# Patient Record
Sex: FEMALE
[De-identification: names, ages, dates, MRNs, and addresses within clinical notes are randomized per-mention and may not be internally consistent; named-entity substitution may affect disease eponyms.]

---
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
## 2022-03-04 ENCOUNTER — APPOINTMENT (OUTPATIENT)
Dept: PEDIATRICS | Facility: CLINIC | Age: 22
End: 2022-03-04
Payer: COMMERCIAL

## 2022-03-04 VITALS — OXYGEN SATURATION: 97 % | TEMPERATURE: 97.6 F

## 2022-03-04 PROCEDURE — 99214 OFFICE O/P EST MOD 30 MIN: CPT | Mod: 25

## 2022-03-04 RX ORDER — ALBUTEROL SULFATE 2.5 MG/3ML
(2.5 MG/3ML) SOLUTION RESPIRATORY (INHALATION)
Qty: 0 | Refills: 0 | Status: COMPLETED | OUTPATIENT
Start: 2022-03-04

## 2022-03-04 RX ADMIN — ALBUTEROL SULFATE 1 0.083%: 2.5 SOLUTION RESPIRATORY (INHALATION) at 00:00

## 2022-03-04 NOTE — DISCUSSION/SUMMARY
[FreeTextEntry1] : ALBUTEROL NEB GIVEN IN OFFICE X 1\par POST TX LCTA BL\par O2 SAT 99%\par \par RX ZPACK X 5 DAYS\par PREDNISONE X 5 DAYS\par CONT W INHALER 2 PUFFS Q4-6 PRN\par RE CK CHEST IN 1 WK OR SOONER IF SX WORSEN

## 2022-03-04 NOTE — HISTORY OF PRESENT ILLNESS
[de-identified] : cough/chest tightness [FreeTextEntry6] : sick for past 2 wks\par has been [facility]\par Dx w bronchitis - on albuterol HFA w minimal improvement\par cough is worse\par feeling tight

## 2022-06-20 ENCOUNTER — APPOINTMENT (OUTPATIENT)
Dept: PEDIATRICS | Facility: CLINIC | Age: 22
End: 2022-06-20
Payer: COMMERCIAL

## 2022-06-20 DIAGNOSIS — G47.33 OBSTRUCTIVE SLEEP APNEA (ADULT) (PEDIATRIC): ICD-10-CM

## 2022-06-20 DIAGNOSIS — Z87.19 PERSONAL HISTORY OF OTHER DISEASES OF THE DIGESTIVE SYSTEM: ICD-10-CM

## 2022-06-20 DIAGNOSIS — Z87.09 PERSONAL HISTORY OF OTHER DISEASES OF THE RESPIRATORY SYSTEM: ICD-10-CM

## 2022-06-20 PROCEDURE — 99213 OFFICE O/P EST LOW 20 MIN: CPT

## 2022-06-20 RX ORDER — BENZONATATE 200 MG/1
200 CAPSULE ORAL
Qty: 20 | Refills: 0 | Status: COMPLETED | COMMUNITY
Start: 2022-02-23

## 2022-06-20 RX ORDER — FLUOXETINE HYDROCHLORIDE 40 MG/1
CAPSULE ORAL
Refills: 0 | Status: COMPLETED | COMMUNITY
End: 2022-06-20

## 2022-06-20 RX ORDER — SOLRIAMFETOL 75 MG/1
75 TABLET, FILM COATED ORAL
Qty: 30 | Refills: 0 | Status: COMPLETED | COMMUNITY
Start: 2021-12-30

## 2022-06-20 RX ORDER — PREDNISONE 20 MG/1
20 TABLET ORAL DAILY
Qty: 10 | Refills: 0 | Status: COMPLETED | COMMUNITY
Start: 2022-03-04 | End: 2022-06-20

## 2022-06-20 RX ORDER — ALBUTEROL SULFATE 90 UG/1
108 (90 BASE) INHALANT RESPIRATORY (INHALATION)
Qty: 8 | Refills: 0 | Status: COMPLETED | COMMUNITY
Start: 2022-02-28

## 2022-06-20 RX ORDER — FLUCONAZOLE 150 MG/1
150 TABLET ORAL
Qty: 1 | Refills: 0 | Status: COMPLETED | COMMUNITY
Start: 2022-04-05

## 2022-06-20 NOTE — HISTORY OF PRESENT ILLNESS
[de-identified] : persistent cough [FreeTextEntry6] : patient has concerns about a persistent cough over past several weeks no distress no fever \par at times productive \par denies sinus headache or pain\par also has inner left thigh irritation/abrasion due to  duties and running around for the [facility]

## 2022-06-20 NOTE — DISCUSSION/SUMMARY
[FreeTextEntry1] : persistent cough warrants trial of a zpak\par abrasion will use mupirocin \par call back prn

## 2022-06-20 NOTE — PHYSICAL EXAM
[Clear to Auscultation Bilaterally] : clear to auscultation bilaterally [NL] : normotonic [de-identified] : abrasion left inner thigh

## 2022-06-20 NOTE — REVIEW OF SYSTEMS
[Cough] : cough [Abrasion] : abrasion [Negative] : Genitourinary [Fever] : no fever [Chills] : no chills [Headache] : no headache [Eye Discharge] : no eye discharge [Ear Pain] : no ear pain [Nasal Discharge] : no nasal discharge [Nasal Congestion] : no nasal congestion [Sinus Pressure] : no sinus pressure [Sore Throat] : no sore throat [Tachypnea] : not tachypneic [Wheezing] : no wheezing [Congestion] : no congestion [Shortness of Breath] : no shortness of breath

## 2022-06-30 ENCOUNTER — APPOINTMENT (OUTPATIENT)
Dept: PEDIATRICS | Facility: CLINIC | Age: 22
End: 2022-06-30

## 2022-06-30 VITALS — TEMPERATURE: 98.1 F

## 2022-06-30 DIAGNOSIS — Z87.828 PERSONAL HISTORY OF OTHER (HEALED) PHYSICAL INJURY AND TRAUMA: ICD-10-CM

## 2022-06-30 PROCEDURE — 99213 OFFICE O/P EST LOW 20 MIN: CPT

## 2022-06-30 RX ORDER — DOXYCYCLINE HYCLATE 100 MG/1
100 CAPSULE ORAL
Qty: 14 | Refills: 0 | Status: COMPLETED | COMMUNITY
Start: 2022-04-05 | End: 2022-06-30

## 2022-06-30 RX ORDER — AMOXICILLIN AND CLAVULANATE POTASSIUM 875; 125 MG/1; MG/1
875-125 TABLET, COATED ORAL
Qty: 20 | Refills: 0 | Status: COMPLETED | COMMUNITY
Start: 2022-06-30 | End: 2022-07-10

## 2022-06-30 RX ORDER — AZITHROMYCIN 250 MG/1
250 TABLET, FILM COATED ORAL
Qty: 6 | Refills: 0 | Status: COMPLETED | COMMUNITY
Start: 2022-03-04 | End: 2022-06-30

## 2022-06-30 NOTE — HISTORY OF PRESENT ILLNESS
[de-identified] : persistent cough [FreeTextEntry6] : patient has the cough which did not respond to the zpak prescribed on june 20.\par cough productive \par some rhinorrhea which when present worsens the cough \par denies facial pain or headache\par with the pnd may well be a sinusitis that did not respond to the zpak\par try course of augmentin as well as flonase to be started\par follow up prn here\par patient was told by student health b/c of a multitude of sinus infections this year to see ENT\par

## 2022-06-30 NOTE — PHYSICAL EXAM
[Clear TM bilaterally] : clear tympanic membranes bilaterally [Nonerythematous Oropharynx] : nonerythematous oropharynx [Clear to Auscultation Bilaterally] : clear to auscultation bilaterally [NL] : warm [de-identified] : thick white pnd noted

## 2022-06-30 NOTE — DISCUSSION/SUMMARY
[FreeTextEntry1] : Recommend antibiotics, nasal saline, and guaifenesin. Side effect of treatment includes but not limited to diarrhea. Return if symptoms worsen or persist.\par has been referred to ENT due to number of recurrences

## 2022-06-30 NOTE — REVIEW OF SYSTEMS
[Fever] : no fever [Headache] : no headache [Eye Discharge] : no eye discharge [Ear Pain] : no ear pain [Nasal Discharge] : no nasal discharge [Nasal Congestion] : nasal congestion [Snoring] : no snoring [Sinus Pressure] : no sinus pressure [Sore Throat] : no sore throat [Tachypnea] : not tachypneic [Wheezing] : no wheezing [Cough] : cough [Negative] : Skin

## 2022-08-10 ENCOUNTER — APPOINTMENT (OUTPATIENT)
Dept: PEDIATRICS | Facility: CLINIC | Age: 22
End: 2022-08-10

## 2022-12-21 ENCOUNTER — APPOINTMENT (OUTPATIENT)
Dept: PEDIATRICS | Facility: CLINIC | Age: 22
End: 2022-12-21

## 2022-12-21 DIAGNOSIS — J32.9 CHRONIC SINUSITIS, UNSPECIFIED: ICD-10-CM

## 2022-12-21 PROCEDURE — 99213 OFFICE O/P EST LOW 20 MIN: CPT

## 2022-12-21 RX ORDER — MUPIROCIN 20 MG/G
2 OINTMENT TOPICAL TWICE DAILY
Qty: 1 | Refills: 1 | Status: DISCONTINUED | COMMUNITY
Start: 2022-06-20 | End: 2022-12-21
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
## 2022-12-21 NOTE — PHYSICAL EXAM
[NL] : moves all extremities x4, warm, well perfused x4 [de-identified] : remnants of facial hematoma to R side of face under cheekbone no fracture likely all hematoma related -quarter size felt under skin but due to makeup cant see discoloration although pt says not discolored

## 2022-12-21 NOTE — DISCUSSION/SUMMARY
[FreeTextEntry1] : hematoma secondary to injury\par no need for imaging or meds\par reassured will heal over time\par if doesn’t can refer out but essentially not much intervention to have done\par chart reviewed

## 2023-05-16 ENCOUNTER — APPOINTMENT (OUTPATIENT)
Dept: PEDIATRICS | Facility: CLINIC | Age: 23
End: 2023-05-16
Payer: COMMERCIAL

## 2023-05-16 VITALS — WEIGHT: 234.38 LBS | HEIGHT: 64.25 IN | BODY MASS INDEX: 40.01 KG/M2 | TEMPERATURE: 97.6 F

## 2023-05-16 LAB
BILIRUB UR QL STRIP: NEGATIVE
GLUCOSE UR-MCNC: NEGATIVE
HCG UR QL: 0.2 EU/DL
HGB UR QL STRIP.AUTO: NEGATIVE
KETONES UR-MCNC: NEGATIVE
LEUKOCYTE ESTERASE UR QL STRIP: NEGATIVE
NITRITE UR QL STRIP: NEGATIVE
PH UR STRIP: 6
PROT UR STRIP-MCNC: NEGATIVE
SP GR UR STRIP: >=1.03

## 2023-05-16 PROCEDURE — 81003 URINALYSIS AUTO W/O SCOPE: CPT | Mod: QW

## 2023-05-16 PROCEDURE — 99214 OFFICE O/P EST MOD 30 MIN: CPT

## 2023-05-16 RX ORDER — FLUOXETINE HYDROCHLORIDE 20 MG/1
20 CAPSULE ORAL
Qty: 90 | Refills: 0 | Status: DISCONTINUED | COMMUNITY
Start: 2022-05-04 | End: 2023-05-16

## 2023-05-16 NOTE — HISTORY OF PRESENT ILLNESS
[de-identified] : weight gain [FreeTextEntry6] : extremely upset about continued weight gain\par pt states nothing is different and she constantly gains weight\par has tried multiple diets, work outs with no success\par Pt was studying abroad Fall 2022 [place] and fx her ankle - was limited in \par walking x 1 month. \par Pt has been back for past 4 months with no change\par Pt is very sad and upset about this\par her confidence is affected\par Pt was seen by GYN to R/O PCOS\par LMP regular and isiah\par not hairy\par on meds for acne

## 2023-05-16 NOTE — DISCUSSION/SUMMARY
[FreeTextEntry1] : Discussed at length with Pt and mom importance of lifestyle modification to prevent delay future complications. \par Pt should continue to see nutritionist and follow recommended meal plan suggested.\par In addition, advised increase in physical activity to at least 30 minutes a day / 5 days per wk.\par \par Obesity is a serious ongoing health concern, affecting approx 17% of [place] children and adolescent, threatening their adult health and longevity.\par Pediatric obesity has its basis in genetic susceptibilities influenced by permissive environment starting in utero and extending through childhood and adolescent.\par \par RX TO LAB -FASTING\par FOR MULTIPLE HORMONE, VITAMIN ETC\par \par REFER TO DR RADHA PANTOJA FOR WEIGHT LOSS\par TO WORK W NUTRITIONIST\par \par TIME SPENT 45 MIN

## 2023-06-09 ENCOUNTER — APPOINTMENT (OUTPATIENT)
Dept: PEDIATRICS | Facility: CLINIC | Age: 23
End: 2023-06-09
Payer: COMMERCIAL

## 2023-06-09 VITALS — TEMPERATURE: 97.3 F

## 2023-06-09 DIAGNOSIS — J06.9 ACUTE UPPER RESPIRATORY INFECTION, UNSPECIFIED: ICD-10-CM

## 2023-06-09 PROCEDURE — 99213 OFFICE O/P EST LOW 20 MIN: CPT

## 2023-06-09 NOTE — DISCUSSION/SUMMARY
[FreeTextEntry1] : Viral. Supportive care at this time. Encourage plenty of fluids. Exposure to steam shower for congestion. May try OTC flonase. Monitor symptoms. If pt develops a fever or has worsening symptoms over the next few days to RTO for re-evaluation. \par \par All questions answered, reassurance provided. \par Instructed to call the office with any questions, concerns or worsening symptoms.

## 2023-06-09 NOTE — PHYSICAL EXAM
[Clear] : right tympanic membrane clear [Erythematous Oropharynx] : erythematous oropharynx [Clear to Auscultation Bilaterally] : clear to auscultation bilaterally [Regular Rate and Rhythm] : regular rate and rhythm [Soft] : soft [Tender] : nontender [Distended] : nondistended [No Abnormal Lymph Nodes Palpated] : no abnormal lymph nodes palpated [Moves All Extremities x 4] : moves all extremities x4 [NL] : warm, clear [de-identified] : mild

## 2023-06-09 NOTE — HISTORY OF PRESENT ILLNESS
[FreeTextEntry6] : Pt presents with congestion for 4 days. Pts throat hurt initially, but not anymore. Pt coughing more the last two days, particularly when she awakens in the morning. Pt states she can feel a post-nasal drip. Mucus is yellow. No fevers. Pts sister has a similar cold. Pt taking doxycycline for skin and nyquil for symptoms. Denies N/V/D. No rashes.

## 2023-06-20 ENCOUNTER — APPOINTMENT (OUTPATIENT)
Dept: PEDIATRICS | Facility: CLINIC | Age: 23
End: 2023-06-20
Payer: COMMERCIAL

## 2023-06-20 VITALS
HEIGHT: 63.82 IN | WEIGHT: 240.13 LBS | BODY MASS INDEX: 41.5 KG/M2 | DIASTOLIC BLOOD PRESSURE: 73 MMHG | HEART RATE: 73 BPM | OXYGEN SATURATION: 98 % | SYSTOLIC BLOOD PRESSURE: 118 MMHG

## 2023-06-20 PROCEDURE — 99215 OFFICE O/P EST HI 40 MIN: CPT

## 2023-06-20 PROCEDURE — 99205 OFFICE O/P NEW HI 60 MIN: CPT

## 2023-06-20 RX ORDER — SOLRIAMFETOL 150 MG/1
150 TABLET, FILM COATED ORAL
Qty: 30 | Refills: 0 | Status: DISCONTINUED | COMMUNITY
Start: 2022-03-23 | End: 2023-06-20

## 2023-06-20 NOTE — PHYSICAL EXAM
[Normal] : supple and no neck mass was observed [de-identified] : striae on abdomen [de-identified] : soft, nontender, central adiposity

## 2023-06-20 NOTE — ASSESSMENT
[Case reviewed with RD. Please see RD note for additional details such as lifestyle habits] : Case reviewed with RD. Please see RD note for additional details such as lifestyle habits and specific behavioral goals [FreeTextEntry1] : \par 21yo w/ class 3 severe obesity and hx of disordered eating/compensatory behaviors, anxiety/depression, numerous attempts at weight loss with lifestyle interventions without sustained success, presenting for initial weight management visit. Obesity due to inherited risk in the context of obesogenic environment, as well as eating to cope w/ emotions.\par \par No evidence of underlying endocrinopathy or genetic abnormality.\par \par Discussed risks and benefits of GLP1 agonists, which patient expressed interest in, with most efficacious and clinically appropriate option being semaglutide, given its superior relative efficacy for weight loss and comorbidity improvement. Will prescribe 0.25mg weekly x4 weeks to begin on a Friday allowing for worst side effects on weekend (nausea, vomiting, abdom pain, constipation, diarrhea, fatigue); stressed importance of at least 3 balanced meals daily and adequate hydration.\par \par Patient aware of unpredictable insurance coverage.\par \par F/u 1m with RD and next available with me. Will need to determine if telehealth with patient in [place] ok, otherwise will need to find provider there who can continue prescribing.

## 2023-06-20 NOTE — DATA REVIEWED
[Growth Curves] : Growth curves [Recent Lab Results] : recent lab results [Recent Provider Documentation] : recent provider documentation [FreeTextEntry1] : lipids, cmp, hba1c all normal

## 2023-06-20 NOTE — HISTORY OF PRESENT ILLNESS
[Normal] : normal [FreeTextEntry1] : Family History:\par Overweight/obesity - mom and extended fam on her side\par Use of antiobesity medications - not sure\par Bariatric surgery - mat aunt\par Type 2 Diabetes - no\par Type 1 diabetes - mom\par Hashimotos - cousins\par Celiac - mat aunt\par \par PMH/ROS:\par Headaches - no\par Mood sxs - depression and anxiety, was on prozac for a while and made her feel not like herself, has psychiatrist\par Narcolepsy - s/p modafenil and sunosi trials, has neurologist at [facility] [FreeTextEntry2] : - struggling with weight forever [FreeTextEntry3] : - whole life; studied abroad in the fall and broke foot which made her unable to be physically active [FreeTextEntry4] : - tried lots of different diets: vegetarian, keto; 2 years ago restricted to 1 meal per day and over exercising; then at night would binge\par - for last 5-7 months hasn't binge eaten at all; got really into cycling which has helped maintain more steady relationship with food/exercise\par - has seen many different therapists, likes her therapist at school ([facility]) but that person can't see her while she's in [place] [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

## 2023-06-21 ENCOUNTER — NON-APPOINTMENT (OUTPATIENT)
Age: 23
End: 2023-06-21

## 2023-07-05 ENCOUNTER — NON-APPOINTMENT (OUTPATIENT)
Age: 23
End: 2023-07-05

## 2023-07-18 ENCOUNTER — APPOINTMENT (OUTPATIENT)
Dept: PEDIATRIC ADOLESCENT MEDICINE | Facility: CLINIC | Age: 23
End: 2023-07-18

## 2023-07-25 ENCOUNTER — OUTPATIENT (OUTPATIENT)
Dept: OUTPATIENT SERVICES | Age: 23
LOS: 1 days | End: 2023-07-25

## 2023-07-25 ENCOUNTER — APPOINTMENT (OUTPATIENT)
Dept: PEDIATRICS | Facility: CLINIC | Age: 23
End: 2023-07-25
Payer: COMMERCIAL

## 2023-07-25 VITALS
BODY MASS INDEX: 40.21 KG/M2 | SYSTOLIC BLOOD PRESSURE: 134 MMHG | DIASTOLIC BLOOD PRESSURE: 71 MMHG | HEIGHT: 65 IN | WEIGHT: 241.31 LBS | HEART RATE: 73 BPM

## 2023-07-25 PROCEDURE — 99211 OFF/OP EST MAY X REQ PHY/QHP: CPT

## 2023-08-09 ENCOUNTER — NON-APPOINTMENT (OUTPATIENT)
Age: 23
End: 2023-08-09

## 2023-08-15 ENCOUNTER — APPOINTMENT (OUTPATIENT)
Dept: PEDIATRICS | Facility: CLINIC | Age: 23
End: 2023-08-15
Payer: COMMERCIAL

## 2023-08-15 ENCOUNTER — OUTPATIENT (OUTPATIENT)
Dept: OUTPATIENT SERVICES | Age: 23
LOS: 1 days | End: 2023-08-15

## 2023-08-15 ENCOUNTER — APPOINTMENT (OUTPATIENT)
Dept: PEDIATRICS | Facility: CLINIC | Age: 23
End: 2023-08-15

## 2023-08-15 DIAGNOSIS — J45.21 MILD INTERMITTENT ASTHMA WITH (ACUTE) EXACERBATION: ICD-10-CM

## 2023-08-15 PROCEDURE — 99214 OFFICE O/P EST MOD 30 MIN: CPT | Mod: 95

## 2023-08-16 DIAGNOSIS — E66.9 OBESITY, UNSPECIFIED: ICD-10-CM

## 2023-08-16 DIAGNOSIS — Z76.89 PERSONS ENCOUNTERING HEALTH SERVICES IN OTHER SPECIFIED CIRCUMSTANCES: ICD-10-CM

## 2023-08-16 PROBLEM — J45.21 MILD INTERMITTENT REACTIVE AIRWAY DISEASE WITH WHEEZING WITH ACUTE EXACERBATION: Status: ACTIVE | Noted: 2022-03-04

## 2023-08-16 RX ORDER — SEMAGLUTIDE 0.25 MG/.5ML
0.25 INJECTION, SOLUTION SUBCUTANEOUS
Qty: 1 | Refills: 0 | Status: DISCONTINUED | COMMUNITY
Start: 2023-06-20 | End: 2023-08-16

## 2023-08-16 NOTE — ASSESSMENT
[Case reviewed with RD. Please see RD note for additional details such as lifestyle habits] : Case reviewed with RD. Please see RD note for additional details such as lifestyle habits and specific behavioral goals [FreeTextEntry1] :  23yo w/ class 3 severe obesity tolerating wegovy 0.5mg and making lifestyle changes with weight stability. - continue 0.5mg x4 weeks then plan to increase to 1mg weekly - encouraged to continue lifestyle changes - f/u in person when home from college and can touch base by phone in between

## 2023-08-16 NOTE — HISTORY OF PRESENT ILLNESS
[FreeTextEntry1] : Now s/p 4 doses wegovy 0.25 and one dose wegovy 0.5 and feels that she is eating much smaller portions, often finishing 1/2 of what she would previously eat. Trying to make healthy choices and having an easier time doing so. Continuing to be physically active. No significant abdom pain, nausea, vomiting, constipation or diarrhea though does note occasional nausea in days after dose.  No new medications. Feels like she's making progress but frustrated that weight has stayed the same.

## 2023-08-16 NOTE — REASON FOR VISIT
[Home] : at home, [unfilled] , at the time of the visit. [Medical Office: ([facility])___] : at the medical office located in  [Self] : self [Follow-up Weight Management] : a follow-up weight management visit [Patient] : patient

## 2023-08-28 DIAGNOSIS — J45.21 MILD INTERMITTENT ASTHMA WITH (ACUTE) EXACERBATION: ICD-10-CM

## 2023-08-28 DIAGNOSIS — E66.01 MORBID (SEVERE) OBESITY DUE TO EXCESS CALORIES: ICD-10-CM

## 2023-09-18 ENCOUNTER — NON-APPOINTMENT (OUTPATIENT)
Age: 23
End: 2023-09-18

## 2023-11-13 VITALS — WEIGHT: 215 LBS

## 2023-11-15 RX ORDER — SEMAGLUTIDE 1 MG/.5ML
1 INJECTION, SOLUTION SUBCUTANEOUS
Qty: 1 | Refills: 0 | Status: DISCONTINUED | COMMUNITY
Start: 2023-07-26 | End: 2023-11-15

## 2023-11-18 ENCOUNTER — RX CHANGE (OUTPATIENT)
Age: 23
End: 2023-11-18

## 2023-11-18 RX ORDER — SEMAGLUTIDE 1.7 MG/.75ML
1.7 INJECTION, SOLUTION SUBCUTANEOUS
Qty: 3 | Refills: 0 | Status: DISCONTINUED | COMMUNITY
Start: 2023-10-10 | End: 2023-11-18

## 2023-12-19 ENCOUNTER — APPOINTMENT (OUTPATIENT)
Dept: PEDIATRICS | Facility: CLINIC | Age: 23
End: 2023-12-19
Payer: COMMERCIAL

## 2023-12-19 ENCOUNTER — OUTPATIENT (OUTPATIENT)
Dept: OUTPATIENT SERVICES | Age: 23
LOS: 1 days | End: 2023-12-19

## 2023-12-19 VITALS
HEART RATE: 82 BPM | DIASTOLIC BLOOD PRESSURE: 58 MMHG | HEIGHT: 63.9 IN | BODY MASS INDEX: 38.02 KG/M2 | SYSTOLIC BLOOD PRESSURE: 120 MMHG | WEIGHT: 220.01 LBS

## 2023-12-19 DIAGNOSIS — E66.01 MORBID (SEVERE) OBESITY DUE TO EXCESS CALORIES: ICD-10-CM

## 2023-12-19 DIAGNOSIS — G47.9 SLEEP DISORDER, UNSPECIFIED: ICD-10-CM

## 2023-12-19 PROCEDURE — 99214 OFFICE O/P EST MOD 30 MIN: CPT

## 2023-12-19 NOTE — HISTORY OF PRESENT ILLNESS
[FreeTextEntry1] : - feeling well on 1.7mg semaglutide; few side effects some stomachache in the mornings if she eats late at night - some nausea/vomiting if she drinks alcohol within 1-2 days of dose, changed day of dose and drinking infrequently - not on any other meds currently but considering going back on something for narcolepsy; looking for adult primary care doctor - no constipation - still in therapy - feeling good about current weight and feeling more comfortable in her body physically, moving more easily - lots of issues with insurance and pharmacy getting wegovy - has one pen left and due to take on Friday   [FreeTextEntry8] : regular monthly period

## 2023-12-19 NOTE — ASSESSMENT
[Case reviewed with RD. Please see RD note for additional details such as lifestyle habits] : Case reviewed with RD. Please see RD note for additional details such as lifestyle habits and specific behavioral goals [FreeTextEntry1] : 24 yo w/ class 3 obesity, now class 2 since initiating semaglutide, doing well on 1.7mg weekly with minimal side effects. Not enough data points to truly assess whether weight has plateaued. - continue current dose; can increase to 2.4mg if/when feeling return of excessive hunger or true plateau - f/u when next in [place], May 2024 - will give info for [facility] to [facility] to adult care, generally long weight list so should call now

## 2023-12-19 NOTE — DATA REVIEWED
[Growth Curves] : Growth curves [Recent Provider Documentation] : recent provider documentation [FreeTextEntry1] : last labs May, 2023 and normal

## 2023-12-19 NOTE — PHYSICAL EXAM
[Normal] : normal respiratory rhythm and effort and clear bilateral breath sounds [de-identified] : soft, nontender

## 2023-12-21 DIAGNOSIS — E66.01 MORBID (SEVERE) OBESITY DUE TO EXCESS CALORIES: ICD-10-CM

## 2023-12-21 DIAGNOSIS — E66.9 OBESITY, UNSPECIFIED: ICD-10-CM

## 2023-12-21 DIAGNOSIS — G47.9 SLEEP DISORDER, UNSPECIFIED: ICD-10-CM

## 2024-01-30 ENCOUNTER — NON-APPOINTMENT (OUTPATIENT)
Age: 24
End: 2024-01-30

## 2024-03-15 ENCOUNTER — APPOINTMENT (OUTPATIENT)
Dept: PEDIATRICS | Facility: CLINIC | Age: 24
End: 2024-03-15
Payer: COMMERCIAL

## 2024-03-15 VITALS — TEMPERATURE: 97.6 F

## 2024-03-15 PROCEDURE — 99213 OFFICE O/P EST LOW 20 MIN: CPT

## 2024-03-15 NOTE — PHYSICAL EXAM
[Clear to Auscultation Bilaterally] : clear to auscultation bilaterally [NL] : warm, clear [FreeTextEntry4] : congestion

## 2024-03-15 NOTE — DISCUSSION/SUMMARY
[FreeTextEntry1] : with treat the respiratory infection with a zpak will treat nasal symptoms with flonase and astelin likely origin is infectious not the mold exposure though that did not help

## 2024-03-15 NOTE — REVIEW OF SYSTEMS
[Fever] : no fever [Eye Discharge] : no eye discharge [Eye Redness] : no eye redness [Ear Pain] : no ear pain [Nasal Congestion] : nasal congestion [Tachypnea] : not tachypneic [Wheezing] : no wheezing [Cough] : cough [Congestion] : no congestion [Shortness of Breath] : no shortness of breath [Negative] : Genitourinary

## 2024-03-15 NOTE — HISTORY OF PRESENT ILLNESS
[de-identified] : cough congestion [FreeTextEntry6] : presents with above concern b/c of 5 day exposure to black mold in a hotel no fevers no respiratory distress

## 2024-03-28 NOTE — CONSULT LETTER
[Consult Letter:] : I had the pleasure of evaluating your patient, [unfilled]. [Please see my note below.] : Please see my note below. [Consult Closing:] : Thank you very much for allowing me to participate in the care of this patient.  If you have any questions, please do not hesitate to contact me. [Sincerely,] : Sincerely, [FreeTextEntry3] : Dear Dr MEHKI PABLO ,\par \par I had the pleasure of evaluating NATALI VASQUEZ in the pediatric neurology/epilepsy/sleep clinic on Aug 22, 2018 for the problem of possible neurological/seizures/sleep problems as a new/follow up patient NATALI VASQUEZ, who is a 17 year girl . Although  her history is well known to you, please allow me to reiterate it for the purpose of our medical records.  \par \par Please see my note below\par  no PAH/delayed awakening No

## 2024-05-21 ENCOUNTER — APPOINTMENT (OUTPATIENT)
Age: 24
End: 2024-05-21

## 2024-05-28 ENCOUNTER — APPOINTMENT (OUTPATIENT)
Age: 24
End: 2024-05-28
Payer: COMMERCIAL

## 2024-05-28 ENCOUNTER — OUTPATIENT (OUTPATIENT)
Dept: OUTPATIENT SERVICES | Age: 24
LOS: 1 days | End: 2024-05-28

## 2024-05-28 VITALS
SYSTOLIC BLOOD PRESSURE: 133 MMHG | WEIGHT: 210 LBS | DIASTOLIC BLOOD PRESSURE: 78 MMHG | HEART RATE: 71 BPM | BODY MASS INDEX: 36.29 KG/M2 | HEIGHT: 63.66 IN

## 2024-05-28 DIAGNOSIS — S00.83XA CONTUSION OF OTHER PART OF HEAD, INITIAL ENCOUNTER: ICD-10-CM

## 2024-05-28 DIAGNOSIS — Z84.89 FAMILY HISTORY OF OTHER SPECIFIED CONDITIONS: ICD-10-CM

## 2024-05-28 DIAGNOSIS — G47.419 NARCOLEPSY W/OUT CATAPLEXY: ICD-10-CM

## 2024-05-28 DIAGNOSIS — Z83.3 FAMILY HISTORY OF DIABETES MELLITUS: ICD-10-CM

## 2024-05-28 DIAGNOSIS — J98.8 OTHER SPECIFIED RESPIRATORY DISORDERS: ICD-10-CM

## 2024-05-28 DIAGNOSIS — Z83.42 FAMILY HISTORY OF FAMILIAL HYPERCHOLESTEROLEMIA: ICD-10-CM

## 2024-05-28 DIAGNOSIS — Z76.89 PERSONS ENCOUNTERING HEALTH SERVICES IN OTHER SPECIFIED CIRCUMSTANCES: ICD-10-CM

## 2024-05-28 DIAGNOSIS — Z13.0 ENCOUNTER FOR SCREENING FOR DISEASES OF THE BLOOD AND BLOOD-FORMING ORGANS AND CERTAIN DISORDERS INVOLVING THE IMMUNE MECHANISM: ICD-10-CM

## 2024-05-28 DIAGNOSIS — Z82.0 FAMILY HISTORY OF EPILEPSY AND OTHER DISEASES OF THE NERVOUS SYSTEM: ICD-10-CM

## 2024-05-28 DIAGNOSIS — Z13.1 ENCOUNTER FOR SCREENING FOR DIABETES MELLITUS: ICD-10-CM

## 2024-05-28 DIAGNOSIS — Z83.49 FAMILY HISTORY OF OTHER ENDOCRINE, NUTRITIONAL AND METABOLIC DISEASES: ICD-10-CM

## 2024-05-28 DIAGNOSIS — Z13.228 ENCOUNTER FOR SCREENING FOR OTHER METABOLIC DISORDERS: ICD-10-CM

## 2024-05-28 DIAGNOSIS — Z13.29 ENCOUNTER FOR SCREENING FOR OTHER SUSPECTED ENDOCRINE DISORDER: ICD-10-CM

## 2024-05-28 DIAGNOSIS — Z13.220 ENCOUNTER FOR SCREENING FOR LIPOID DISORDERS: ICD-10-CM

## 2024-05-28 PROCEDURE — G2211 COMPLEX E/M VISIT ADD ON: CPT

## 2024-05-28 PROCEDURE — 99215 OFFICE O/P EST HI 40 MIN: CPT

## 2024-05-28 RX ORDER — AZELASTINE HYDROCHLORIDE 137 UG/1
137 SPRAY, METERED NASAL TWICE DAILY
Qty: 1 | Refills: 1 | Status: COMPLETED | COMMUNITY
Start: 2024-03-15 | End: 2024-05-28

## 2024-05-28 RX ORDER — FLUTICASONE PROPIONATE 50 UG/1
50 SPRAY, METERED NASAL DAILY
Qty: 1 | Refills: 2 | Status: COMPLETED | COMMUNITY
Start: 2022-06-30 | End: 2024-05-28

## 2024-05-28 RX ORDER — AZITHROMYCIN 250 MG/1
250 TABLET, FILM COATED ORAL
Qty: 1 | Refills: 0 | Status: COMPLETED | COMMUNITY
Start: 2024-03-15 | End: 2024-05-28

## 2024-05-28 NOTE — ASSESSMENT
[Case reviewed with RD. Please see RD note for additional details such as lifestyle habits] : Case reviewed with RD. Please see RD note for additional details such as lifestyle habits and specific behavioral goals [FreeTextEntry1] :  1. Labs done when fasting, given patient service center list.  2. Transition to adult weight management as scheduled next month.  3. Overall doing well on 2.4mg Wegovy; for constipation recommend miralax daily and increase in fiber intake.  4. Establish care with sleep specialist md for narcolepsy.

## 2024-05-28 NOTE — HISTORY OF PRESENT ILLNESS
[FreeTextEntry1] :   Rowdy is a 23 year old female with a PMH of Narcolepsy, BMI 38, Obesity presenting for weight management follow up:  Previously seen by Dr. Nath in Dec 2023.   - feeling well on 2.4mg semaglutide (has been on it for 2 months) - Per Rowdy lost 30 lbs since last june  - Had regained some weight back when she had a week off from medication due to pharmacy delivery  - Did not have any nausea, vomiting when restarting Wegovy after a week break. - Works out 5x a week (atleast a hour of exercise at the gym or walking)  - Was consistent with taking protein as much as possible however since the end of school has struggled to be on top of it.  - does not drink often; two weeks ago had a drink during finals week  - not on any other meds currently but considering going back on something for narcolepsy; looking for adult primary care doctor - since restarting medication recently experiencing constipation increased fiber in diet but has not seen a difference. Started miralax daily which has helped. - no longer in therapy as it was thru her school but looking for a therapist.  - feeling good about current weight and feeling more comfortable in her body physically, moving more easily - getting periods regularly - No family hx of sudden death before age 50, cardiomyopathy, long qt syndrome, short qt syndrome - Denies exercise induced chest pain/shortness of breath, palpitations, headache, unexplained seizure, syncopal episodes.

## 2024-05-30 DIAGNOSIS — Z13.0 ENCOUNTER FOR SCREENING FOR DISEASES OF THE BLOOD AND BLOOD-FORMING ORGANS AND CERTAIN DISORDERS INVOLVING THE IMMUNE MECHANISM: ICD-10-CM

## 2024-05-30 DIAGNOSIS — Z13.29 ENCOUNTER FOR SCREENING FOR OTHER SUSPECTED ENDOCRINE DISORDER: ICD-10-CM

## 2024-05-30 DIAGNOSIS — Z76.89 PERSONS ENCOUNTERING HEALTH SERVICES IN OTHER SPECIFIED CIRCUMSTANCES: ICD-10-CM

## 2024-05-30 DIAGNOSIS — Z13.1 ENCOUNTER FOR SCREENING FOR DIABETES MELLITUS: ICD-10-CM

## 2024-05-30 DIAGNOSIS — Z13.228 ENCOUNTER FOR SCREENING FOR OTHER METABOLIC DISORDERS: ICD-10-CM

## 2024-05-30 DIAGNOSIS — E66.9 OBESITY, UNSPECIFIED: ICD-10-CM

## 2024-05-30 DIAGNOSIS — Z13.220 ENCOUNTER FOR SCREENING FOR LIPOID DISORDERS: ICD-10-CM

## 2024-06-25 ENCOUNTER — OUTPATIENT (OUTPATIENT)
Dept: OUTPATIENT SERVICES | Facility: HOSPITAL | Age: 24
LOS: 1 days | End: 2024-06-25
Payer: COMMERCIAL

## 2024-06-25 ENCOUNTER — APPOINTMENT (OUTPATIENT)
Dept: BARIATRICS/WEIGHT MGMT | Facility: CLINIC | Age: 24
End: 2024-06-25
Payer: COMMERCIAL

## 2024-06-25 DIAGNOSIS — E66.9 OBESITY, UNSPECIFIED: ICD-10-CM

## 2024-06-25 DIAGNOSIS — I10 ESSENTIAL (PRIMARY) HYPERTENSION: ICD-10-CM

## 2024-06-25 PROCEDURE — 99205 OFFICE O/P NEW HI 60 MIN: CPT | Mod: 95

## 2024-06-25 PROCEDURE — G0463: CPT

## 2024-06-25 RX ORDER — SEMAGLUTIDE 2.4 MG/.75ML
2.4 INJECTION, SOLUTION SUBCUTANEOUS
Qty: 3 | Refills: 1 | Status: ACTIVE | COMMUNITY
Start: 2023-11-18 | End: 1900-01-01

## 2024-06-27 PROBLEM — E66.9 OBESITY WITH BODY MASS INDEX (BMI) OF 35.0 TO 39.9 WITHOUT COMORBIDITY: Status: ACTIVE | Noted: 2023-05-16

## 2024-07-01 DIAGNOSIS — E66.9 OBESITY, UNSPECIFIED: ICD-10-CM

## 2024-08-05 ENCOUNTER — APPOINTMENT (OUTPATIENT)
Dept: BARIATRICS/WEIGHT MGMT | Facility: CLINIC | Age: 24
End: 2024-08-05

## 2024-08-07 ENCOUNTER — APPOINTMENT (OUTPATIENT)
Dept: PULMONOLOGY | Facility: CLINIC | Age: 24
End: 2024-08-07

## 2024-08-07 PROBLEM — G47.411 NARCOLEPSY WITH CATAPLEXY: Status: ACTIVE | Noted: 2024-08-07

## 2024-08-07 PROBLEM — Z91.89 AT RISK FOR SLEEP APNEA: Status: ACTIVE | Noted: 2024-08-07

## 2024-08-07 PROCEDURE — 99204 OFFICE O/P NEW MOD 45 MIN: CPT

## 2024-08-07 PROCEDURE — G2211 COMPLEX E/M VISIT ADD ON: CPT

## 2024-08-07 NOTE — ASSESSMENT
[FreeTextEntry1] : 23-year-old female who presents with signs and symptoms indicative of narcolepsy type I with cataplexy occurring 1-2 times per week.  Symptoms been present for at least 5 years.  She underwent PSG/MSLT at [facility] in 2018 which support the diagnosis with a mean sleep onset latency of 5.6 minutes and 3 sleep onset REM sleep episodes on the MSLT.  No ANA was noted at the time of the study.  However her weight is increased considerably since that time and she does report snoring.  Therefore I recommended we perform HST to rule out sleep disordered breathing.  She has both fragmented sleep, daytime somnolence and cataplexy.  The best medication to improve all of the symptoms would be an oxybate based therapy, Xywav or Lumryz.  However before starting these medications we will need to rule out sleep disordered breathing as these medications can adversely affect sleep disordered breathing.  The ramifications of these medications discussed with the patient.  She was told never to take alcohol with these medications and she understands this.  We have chosen Xywav as the starting medication but we will obtain HST results prior to initiating therapy.  The ramifications of NT 1, possible therapeutic options and the potential benefits and adverse effects of Xywav were discussed.  She was told that she may need an adjunctive stimulant medication if Xywav alone is insufficient.  Pitolisant was discussed as the next add-on medication. The dangers of drowsy driving were discussed with the patient.  The patient was warned to avoid drowsy driving. The patient will followup after results of the HST study are available.

## 2024-08-07 NOTE — ASSESSMENT
[FreeTextEntry1] : 23-year-old female who presents with signs and symptoms indicative of narcolepsy type I with cataplexy occurring 1-2 times per week.  Symptoms been present for at least 5 years.  She underwent PSG/MSLT at [facility] in 2018 which support the diagnosis with a mean sleep onset latency of 5.6 minutes and 3 sleep onset REM sleep episodes on the MSLT.  No ANA was noted at the time of the study.  However her weight is increased considerably since that time and she does report snoring.  Therefore I recommended we perform HST to rule out sleep disordered breathing.  She has both fragmented sleep, daytime somnolence and cataplexy.  The best medication to improve all of the symptoms would be an oxybate based therapy, Xywav or Lumryz.  However before starting these medications we will need to rule out sleep disordered breathing as these medications can adversely affect sleep disordered breathing.  The ramifications of these medications discussed with the patient.  She was told never to take alcohol with these medications and she understands this.  We have chosen Xywav as the starting medication but we will obtain HST results prior to initiating therapy.  The ramifications of NT 1, possible therapeutic options and the potential benefits and adverse effects of Xywav were discussed.  She was told that she may need an adjunctive stimulant medication if Xywav alone is insufficient.  Pitolisant was discussed as the next add-on medication. The dangers of drowsy driving were discussed with the patient.  The patient was warned to avoid drowsy driving. The patient will followup after results of the HST study are available.

## 2024-08-07 NOTE — REVIEW OF SYSTEMS
[EDS: ESS=____] : daytime somnolence: ESS=[unfilled] [Recent Wt Gain (___ Lbs)] : recent [unfilled] ~Ulb weight gain [Snoring] : snoring [Obesity] : obesity [Leg Dysesthesias] : no leg dysesthesias [Sleep Paralysis] : sleep paralysis [Cataplexy] : cataplexy [Hypnogogic Hallucinations] : hypnogogic hallucinations [Negative] : Psychiatric

## 2024-08-07 NOTE — ASSESSMENT
[FreeTextEntry1] : 23-year-old female who presents with signs and symptoms indicative of narcolepsy type I with cataplexy occurring 1-2 times per week.  Symptoms been present for at least 5 years.  She underwent PSG/MSLT at [facility] in 2018 which support the diagnosis with a mean sleep onset latency of 5.6 minutes and 3 sleep onset REM sleep episodes on the MSLT.  No ANA was noted at the time of the study.  However her weight is increased considerably since that time and she does report snoring.  Therefore I recommended we perform HST to rule out sleep disordered breathing.  She has both fragmented sleep, daytime somnolence and cataplexy.  The best medication to improve all of the symptoms would be an oxybate based therapy, Xywav or Lumryz.  However before starting these medications we will need to rule out sleep disordered breathing as these medications can adversely affect sleep disordered breathing.  The ramifications of these medications discussed with the patient.  She was told never to take alcohol with these medications and she understands this.  We have chosen Xywav as the starting medication but we will obtain HST results prior to initiating therapy.  The ramifications of NT 1, possible therapeutic options and the potential benefits and adverse effects of Xywav were discussed.  She was told that she may need an adjunctive stimulant medication if Xywav alone is insufficient.  Pitolisant was discussed as the next add-on medication. The dangers of drowsy driving were discussed with the patient.  The patient was warned to avoid drowsy driving. The patient will followup after results of the HST study are available.

## 2024-08-07 NOTE — ASSESSMENT
[FreeTextEntry1] : 23-year-old female who presents with signs and symptoms indicative of narcolepsy type I with cataplexy occurring 1-2 times per week.  Symptoms been present for at least 5 years.  She underwent PSG/MSLT at [facility] in 2018 which support the diagnosis with a mean sleep onset latency of 5.6 minutes and 3 sleep onset REM sleep episodes on the MSLT.  No ANA was noted at the time of the study.  However her weight is increased considerably since that time and she does report snoring.  Therefore I recommended we perform HST to rule out sleep disordered breathing.  She has both fragmented sleep, daytime somnolence and cataplexy.  The best medication to improve all of the symptoms would be an oxybate based therapy, Xywav or Lumryz.  However before starting these medications we will need to rule out sleep disordered breathing as these medications can adversely affect sleep disordered breathing.  The ramifications of these medications discussed with the patient.  She was told never to take alcohol with these medications and she understands this.  We have chosen Xywav as the starting medication but we will obtain HST results prior to initiating therapy.  The ramifications of NT 1, possible therapeutic options and the potential benefits and adverse effects of Xywav were discussed.  She was told that she may need an adjunctive stimulant medication if Xywav alone is insufficient.  Pitolisant was discussed as the next add-on medication. The dangers of drowsy driving were discussed with the patient.  The patient was warned to avoid drowsy driving. The patient will followup after results of the HST study are available.

## 2024-08-07 NOTE — REVIEW OF SYSTEMS
[EDS: ESS=____] : daytime somnolence: ESS=[unfilled] [Recent Wt Gain (___ Lbs)] : recent [unfilled] ~Ulb weight gain [Snoring] : snoring [Obesity] : obesity [Leg Dysesthesias] : no leg dysesthesias [Sleep Paralysis] : sleep paralysis [Cataplexy] : cataplexy [Hypnogogic Hallucinations] : hypnogogic hallucinations [Negative] : Psychiatric 152.4

## 2024-08-11 NOTE — HISTORY OF PRESENT ILLNESS
[FreeTextEntry1] :  EPWORTH SLEEPINESS SCALE  How likely are you to doze off or fall asleep in the situations described below, in contrast to feeling just tired?  This refers to your usual way of life in recent times.  Even if you haven't done some of these things recently, try to work out how they would have affected you. Use the following scale to choose one most appropriate number for each situation.  Chance of dozing.............Situation 3........................................Sitting and reading 3........................................Watching TV 2........................................Sitting inactive in a public place (eg a theatre or a meeting) 3........................................As a passenger in a car for an hour without a break 3........................................Lying down to rest in the afternoon when circumstances permit 2........................................Sitting and talking to someone 3........................................Sitting quietly after lunch without alcohol 3........................................In a car, while stopped for a few minutes in traffic  0........................................TOTAL SCORE  20 = Never would doze 1 = Slight chance of dozing 2 = Moderate chance of dozing 3 = High chance of dozing  ______________________________________________________________________

## 2024-09-05 ENCOUNTER — OUTPATIENT (OUTPATIENT)
Dept: OUTPATIENT SERVICES | Facility: HOSPITAL | Age: 24
LOS: 1 days | End: 2024-09-05
Payer: COMMERCIAL

## 2024-09-05 ENCOUNTER — APPOINTMENT (OUTPATIENT)
Dept: BARIATRICS/WEIGHT MGMT | Facility: CLINIC | Age: 24
End: 2024-09-05
Payer: COMMERCIAL

## 2024-09-05 ENCOUNTER — APPOINTMENT (OUTPATIENT)
Dept: SLEEP CENTER | Facility: CLINIC | Age: 24
End: 2024-09-05
Payer: COMMERCIAL

## 2024-09-05 DIAGNOSIS — I10 ESSENTIAL (PRIMARY) HYPERTENSION: ICD-10-CM

## 2024-09-05 DIAGNOSIS — E66.01 MORBID (SEVERE) OBESITY DUE TO EXCESS CALORIES: ICD-10-CM

## 2024-09-05 PROCEDURE — G2211 COMPLEX E/M VISIT ADD ON: CPT | Mod: NC,95

## 2024-09-05 PROCEDURE — 95800 SLP STDY UNATTENDED: CPT

## 2024-09-05 PROCEDURE — G0463: CPT

## 2024-09-05 PROCEDURE — 99213 OFFICE O/P EST LOW 20 MIN: CPT | Mod: 95

## 2024-09-05 PROCEDURE — 95800 SLP STDY UNATTENDED: CPT | Mod: 26

## 2024-09-05 NOTE — REASON FOR VISIT
[Home] : at home, [unfilled] , at the time of the visit. [Patient] : the patient [Initial Evaluation] : an initial evaluation [Other Location: e.g. Home (Enter Location, City,State)___] : at [unfilled] [FreeTextEntry1] : obesity

## 2024-09-05 NOTE — HISTORY OF PRESENT ILLNESS
[FreeTextEntry1] : 24 yo woman with obesity and narcolepsy returns for obesity medicine f/u  did not get a chance to meet with NP continues on wegovy 2.4mg and has lost an additional 10 lbs  current weight = 190  lbs  height = 5'4 highest weight when first started 240-250 saw sleep specialist and will go for polysomnography given narcolepsy.  if she does not have ANA will be started on xywav continues to exercise regularly  happy with progress and otherwise without new complaints today

## 2024-09-05 NOTE — ASSESSMENT
[FreeTextEntry1] : BARIATRIC SURGERY HISTORY: none  OBESITY COMORBIDITIES: none  ANTI-OBESITY MEDICATIONS: wegovy  OBESITY MEDICATION SIDE EFFECTS: none   Recommend the following: reviewed metabolic labs whole foods based eating strategy limiting refined carbs and added fats - recommend more plant forward approach still recommend working with NP - goal is to increase fiber and whole foods/micronutrient intake without overly focusing on restriction cont on wegovy 2.4 mg for now regular exercise  will be starting on Xywav if ANA screen is negative  rtc in 6  weeks.

## 2024-09-08 ENCOUNTER — NON-APPOINTMENT (OUTPATIENT)
Age: 24
End: 2024-09-08

## 2024-09-09 DIAGNOSIS — E66.01 MORBID (SEVERE) OBESITY DUE TO EXCESS CALORIES: ICD-10-CM

## 2024-09-17 DIAGNOSIS — G47.33 OBSTRUCTIVE SLEEP APNEA (ADULT) (PEDIATRIC): ICD-10-CM

## 2024-10-31 RX ORDER — ARMODAFINIL 150 MG/1
150 TABLET ORAL
Qty: 30 | Refills: 2 | Status: ACTIVE | COMMUNITY
Start: 2024-10-31

## 2024-12-10 ENCOUNTER — NON-APPOINTMENT (OUTPATIENT)
Age: 24
End: 2024-12-10

## 2025-02-05 ENCOUNTER — NON-APPOINTMENT (OUTPATIENT)
Age: 25
End: 2025-02-05

## 2025-02-13 ENCOUNTER — APPOINTMENT (OUTPATIENT)
Dept: BARIATRICS/WEIGHT MGMT | Facility: CLINIC | Age: 25
End: 2025-02-13
Payer: COMMERCIAL

## 2025-02-13 ENCOUNTER — OUTPATIENT (OUTPATIENT)
Dept: OUTPATIENT SERVICES | Facility: HOSPITAL | Age: 25
LOS: 1 days | End: 2025-02-13

## 2025-02-13 DIAGNOSIS — E66.01 MORBID (SEVERE) OBESITY DUE TO EXCESS CALORIES: ICD-10-CM

## 2025-02-13 DIAGNOSIS — I10 ESSENTIAL (PRIMARY) HYPERTENSION: ICD-10-CM

## 2025-02-13 PROCEDURE — 99213 OFFICE O/P EST LOW 20 MIN: CPT | Mod: 95

## 2025-02-13 RX ORDER — TIRZEPATIDE 5 MG/.5ML
5 INJECTION, SOLUTION SUBCUTANEOUS
Qty: 4 | Refills: 5 | Status: ACTIVE | COMMUNITY
Start: 2025-02-13 | End: 1900-01-01

## 2025-02-17 ENCOUNTER — TRANSCRIPTION ENCOUNTER (OUTPATIENT)
Age: 25
End: 2025-02-17

## 2025-04-15 ENCOUNTER — TRANSCRIPTION ENCOUNTER (OUTPATIENT)
Age: 25
End: 2025-04-15

## 2025-05-23 ENCOUNTER — TRANSCRIPTION ENCOUNTER (OUTPATIENT)
Age: 25
End: 2025-05-23

## 2025-05-23 RX ORDER — TIRZEPATIDE 10 MG/.5ML
10 INJECTION, SOLUTION SUBCUTANEOUS
Qty: 4 | Refills: 5 | Status: ACTIVE | COMMUNITY
Start: 2025-05-23 | End: 1900-01-01

## 2025-08-14 ENCOUNTER — RX RENEWAL (OUTPATIENT)
Age: 25
End: 2025-08-14

## 2025-08-17 ENCOUNTER — NON-APPOINTMENT (OUTPATIENT)
Age: 25
End: 2025-08-17